# Patient Record
(demographics unavailable — no encounter records)

---

## 2024-10-10 NOTE — PHYSICAL EXAM
[Alert] : alert [No Acute Distress] : no acute distress [Normal Sclera/Conjunctiva] : normal sclera/conjunctiva [EOMI] : extra ocular movement intact [Normal Outer Ear/Nose] : the ears and nose were normal in appearance [No Neck Mass] : no neck mass was observed [No LAD] : no lymphadenopathy [Thyroid Not Enlarged] : the thyroid was not enlarged [No Thyroid Nodules] : no palpable thyroid nodules [Well Healed Scar] : well healed scar [No Respiratory Distress] : no respiratory distress [Clear to Auscultation] : lungs were clear to auscultation bilaterally [Normal Rate] : heart rate was normal [Regular Rhythm] : with a regular rhythm [Not Tender] : non-tender [Soft] : abdomen soft [No Stigmata of Cushings Syndrome] : no stigmata of Cushings Syndrome [Normal Gait] : normal gait [No Involuntary Movements] : no involuntary movements were seen [No Motor Deficits] : the motor exam was normal [No Tremors] : no tremors [Oriented x3] : oriented to person, place, and time [Normal Affect] : the affect was normal [Normal Insight/Judgement] : insight and judgment were intact [Normal Mood] : the mood was normal

## 2024-10-10 NOTE — HISTORY OF PRESENT ILLNESS
[FreeTextEntry1] : 32 year old woman with papillary thyroid cancer s/p total thyroidectomy and DOS SANTOS tx with post-surgical hypothyroidism, also with hx of pre-diabetes.  #PTC  - Diagnosed at age 19 with Papillary Thyroid Cancer on FNA biopsy in Dec 2011 for goiter work-up.  - Total thyroidectomy on 01/2012 with cervical neck dissection (17/27 LN (+) for metastatic disease) with post op DOS SANTOS (dose unknown). Surg Path: Level 6 Lymph Nodes 17/29 (+) for Papillary Carcinoma. Right Lobe - Multifocal PTC 0.5 cm and 2.5 cm, Classical variant, well-differentiated, Margins uninvolved by carcinoma, No tumor capsule, No extrathyroidal extension, path stage pN1a. Intermediate risk of recurrence (assuming LNs no larger than 3cm, cannot locate path report). - TUS in Feb 2013 showed new LAD. Received whole body scan and repeat DOS SANTOS 102 mci in 3/2013. - 10/2013 - went for second surgery for right neck and central mass removal, positive for malignancy. - Whole body scan done on 5/2014 negative.  - Thyroid US July 2015- No evidence of recurrent mass of thyroid bed. however, had NEW level two right cervical node not previously noted, lacking fatty hilum. FNA of these nodes in 2016 was nondiagnostic.  - TUS 2016 showed new 4mm nodule in Rt thyroid bed with the same cervical LAD. FNA of the LAD in 2017 was benign - Labs have shown persistent Tg levels (0.2 to 0.54) with negative Ab - TUS since 2015 have remained stable. - TUS Aug 2022 showed stable LAD WITHOUT evidence of previously seen right thyroid bed 4mm nodule and a NEGATIVE Thyroglobulin level. TSH goal loosened to 0.5-2. Levothyroxine adjusted to 137 mcg daily (from 150) at that time - Labs 2/2023 TSH 0.02, FT4 1.8, TG 0.29 detectable but low. At that time patient was c/o heat intolerance. Was instructed to decrease to LT4 dose but did not. - 8/2023: LT4 decrease from 137mcg daily to 125mcg daily - TUS 9/2023 right thyroidectomy bed is unremarkable but there was subcm 7 x 6 x 5 mm hypoechoic nodule in the left thyroidectomy bed, not clearly seen on the prior study. It is indeterminate in appearance. There is no lateral cervical adenopathy.  - 11/2023: TSH 0.16, FT4 1.6, TG 0.2 with TG ab negative. TSH goal tightened to 0.1-0.5 given TUS L thyroidectomy bed nodule. - 2/26/2024 TUS 6 month interval repeat: Again noted is 5 x 4 mm indeterminant hypoechoic area in the left thyroidectomy bed, not significantly changed as compared with the prior US 9/20/2023. There is no lateral cervical adenopathy - Labs 2/2024: TSH 0.11 - Labs 4/2024: TSH 0.39, TG 0.3 detectable but low, TG antibodies negative.  Patient reports adherence to LT4 125mcg daily. She still has heat intolerance during the day and night. No palpitations. No n/v, no constipation. Reports regular monthly menses. No compressive sx, no dypshagia/dysphonia, no neck pain. No weight changes.  She reports hx of tubal ligation.   #H/o pre-diabetes.  A1c 5.4% 2/2024 no longer preDM Trying to eat healthy and watch carbs.  #HLD Recent non-fasting lipid panel showed ,  2/2024  #Depression Reports depressive sx, insomnia, feelings of guilt, low energy. PHQ score 18. Holding off pharmacotherapy, watching with PCP.

## 2024-10-10 NOTE — ASSESSMENT
[Nearly Every Day (3)] : 6.) Feeling bad about yourself, or that you are a failure, or have let yourself or your family down? Nearly every day [Not at All (0)] : 9.) Thoughts that you would be off dead or of hurting yourself in some way? Not at all [Moderately Severe] : Severity of Depression is Moderately Severe [Very Difficult] : How difficult have these problems made it for you to do your work, take care of things at home, or get along with people? Very difficult [PHQ-9 Positive] : PHQ-9 Positive [FreeTextEntry1] : 32 year old woman with papillary thyroid cancer s/p total thyroidectomy and DOS SANTOS tx with post-surgical hypothyroidism  #PTC intermediate risk s/p total thyroidectomy + DOS SANTOS in 2012 with incomplete biochemical and structural response, s/p repeat surgery + repeat DOS SANTOS for recurrence in 2013 (as per notes); do not have records of initial pathology or DOS SANTOS scans/treatment although all done here per pt and prior notes TG had been persistently detectable but low Recent TUS 9/2023 and repeat 2/2024 showed new hypoechoic lesion in L thyroidectomy (not previously seen) suggestive of postsurgical change, no lymphadenopathy seen. Currently reports heat intolerance but not too bothersome Last TSH 0.39 with TG low but detectable at 0.3 with negative TG Ab - continue active surveillance, repeat TUS in 1 year (2/2025) - keep TSH at 0.1-0.5 given incomplete biochemical and structural response, currently on LT4 125mcg daily - Repeat TSH, FT4, TG with TG ab today - asked pt to please contact [facility] to obtain record of initial pathology and DOS SANTOS scans/treatment as unable to locate records in EMR   RTC in 6 months  Discussed with Dr. Magdalena Peterson MD Endocrine Fellow [[id number]] : 18

## 2024-10-10 NOTE — REVIEW OF SYSTEMS
[Depression] : depression [Insomnia] : insomnia [Anxiety] : anxiety [Stress] : stress [Heat Intolerance] : heat intolerance [Negative] : Heme/Lymph [Suicidal Ideation] : no suicidal ideation [Polydipsia] : no polydipsia [Cold Intolerance] : no cold intolerance

## 2024-10-16 NOTE — HISTORY OF PRESENT ILLNESS
[Lower-Rt-Q] : lower right quadrant [Suprapubic] : suprapubic area [Lower-Lt-Q] : left lower quadrant [FreeTextEntry8] : radiation to lower back [Heavy Bleeding] : described as heavy in severity [Definite:  ___ (Date)] : the last menstrual period was [unfilled] [Normal Amount/Duration] : was abnormal [Spotting Between  Menses] : no spotting between menses [Regular Cycle Intervals] : periods have been regular [Menstrual Cramps] : no menstrual cramps

## 2024-11-08 NOTE — PLAN
[FreeTextEntry1] : Patient is a 33yo P3 LMP 10/14 referred to GYN booking clinic for 6-8 month history of worsening pelvic pain. Reports heavier menstruation with severe dyspareunia. TVUS revealing adenomyosis without discrete fibroids, no obvious adnexal pathology. Patient has completed childbearing and is interested in definitive surgical management with hysterectomy.  #Pelvic pain/adenomyosis - R/b/a of surgical mgmt with total hysterectomy (and bilateral salpingectomy if tubes remain) discussed at length. Discussed likely ability for minimally invasive surgical approach. - Patient understands that there are many possible etiologies of chronic pelvic pain. We discussed there is a chance she undergoes a total hysterectomy without resolution of her pain. Pt expressed understanding. - EMBx obtained today - Surgical planning initiated - Medicaid hysterectomy form to be completed  #Irregular menses - Recent TFTs reviewed, wnl and followed closely by Endocrine for h/o thyroid cancer. Likely not the etiology of heavy menstruation.   Seen with Dr. Salmon PGY6 and attending, Dr. Abel Hilton PGY3  [facility] Addendum:  31 y/o P3 presenting for consultation for AUB-A, CPP, worse with menses and L>R. Also reports dyspareunia. She has tried OCPs in the past without success. She has completed childbearing and is s/p BTL. PMSH significant for thyroid ca s/p total thyroidectomy. TVUS (10/2024) 9.2cm uterus with adenomyosis. Physical exam c/w 11w anteverted mobile uterus with nonpalpable b/l adnexa. TFTs reviewed and wnl. Counseled patient on options including OCPs, LNG-IUD, GnRH analogs, surgery. Desires definitive management with hysterectomy. EMBx obtained. Plan for TLH, BS, cysto. R/B/A and post op considerations reviewed.  Sirisha Salmon MD [facility], PGY-6.

## 2024-11-08 NOTE — PHYSICAL EXAM
[Chaperone Declined] : Patient declined chaperone [Appropriately responsive] : appropriately responsive [Alert] : alert [No Acute Distress] : no acute distress [Soft] : soft [Non-tender] : non-tender [Non-distended] : non-distended [Oriented x3] : oriented x3 [Labia Majora] : normal [Labia Minora] : normal [Normal] : normal [Uterine Adnexae] : normal [FreeTextEntry4] : extremities well perfused [FreeTextEntry5] : normal respiratory effort on room air

## 2024-11-08 NOTE — PROCEDURE
[Endometrial Biopsy] : Endometrial biopsy [Time out performed] : Pre-procedure time out performed.  Patient's name, date of birth and procedure confirmed. [Consent Obtained] : Consent obtained [Irregular Bleeding] : irregular uterine bleeding [Risks] : risks [Benefits] : benefits [Alternatives] : alternatives [Patient] : patient [Negative] : negative pregnancy test [None] : none [Easy Passage] : Easy passage [Moderate] : moderate [Specimen Collected] : collected [Sent to Pathology] : placed in buffered formalin and sent for pathology [Tolerated Well] : Patient tolerated the procedure well [No Complications] : No complications [de-identified] : not needed

## 2024-11-08 NOTE — HISTORY OF PRESENT ILLNESS
[FreeTextEntry1] : Patient is a 31yo P3 LMP 10/14 referred to GYN booking clinic for 6-8 month history of worsening pelvic pain. Pt reports daily pelvic pain that radiates to the back and is worse on left side. States this pain is worsened when menstruating and during sexual intercourse. The dyspareunia has been present for approx 4 years and has worsening in the past year. Flow of menses has also increased to intermittent passage of large clots. Denies sxs of anemia and has never received a blood transfusion. Pt has completed childbearing and is s/p bilateral salpingectomy (per patient, no available records to confirm) and is interested in definitive surgical mgmt with hysterectomy.  TVUS (10/1/24): Uterus: 9.2 cm x 4.0 cm x 6.1 cm. The myometrium is heterogeneous which may be secondary to underlying adenomyosis. No discrete fibroid is seen. Endometrium: 12 mm. Within normal limits. Adnexa wnl.  OB hx:  x3 PMH: thyroid cancer s/p total thyroidectomy (2012), recurrence to right cervical LN s/p lymphadenectomy (10/2013) Meds: Levothyroxine 125mcg daily, advil PRN, Tylenol PRN All: NKDA PSHx: as above

## 2024-11-08 NOTE — END OF VISIT
[] : Fellow [FreeTextEntry3] : Discussed management of AUB at length, including medical, surgical options, UAE discussed. Patient desires definitive surgery. Discussed risks of TLHBS, cystoscopy. Reviewed potential of scar tissue need for supracervical hysterectomy, understands potential for cyclical bleeding and continual pap smears if cervix left in place. Reviewed risks  of surgery including but not limited to VTE, SSI, damage to bowel, bladder, need for laparotomy, blood transfusion. Reviewed post operative care. All questions answered at length  Sydney Roman MD

## 2024-11-15 NOTE — HISTORY OF PRESENT ILLNESS
[FreeTextEntry8] : 33 yo F with a PMH of papillary thyroid cancer (s/p thyroidectomy w/ DOS SANTOS), surgical hypothyroidism, migraines, presenting for APA. States has had 3 weeks of worsening left flank pain. No fever, dysuria, increased frequency. States recently had left ureteral stent iso septic nephrolithiasis. Pain had started then, but has been acutely worsening over the last 3 weeks, states it feels like renal colic. Recent CT negative for new stone or hydro per patient. Also notes that she has had increased incontinence of urine. Pending surgery for adenomyosis. per gyn may be complex pelvic pain.

## 2025-02-27 NOTE — ASSESSMENT
[Doing Well] : is doing well [No Sign of Infection] : is showing no signs of infection [de-identified] : 3 week cuff check pelvic rest Sydney Roman MD

## 2025-02-27 NOTE — HISTORY OF PRESENT ILLNESS
[Pain is well-controlled] : pain is well-controlled [Fever] : no fever [Chills] : no chills [Diarrhea] : no diarrhea [Pelvic Pressure] : no pelvic pressure [Vaginal Discharge] : no vaginal discharge [Clean/Dry/Intact] : clean, dry and intact [None] : no vaginal bleeding [Pathology reviewed] : pathology reviewed [de-identified] : TLHBS cystoscopy  [de-identified] : fibroid [de-identified] : Pt seen today s/p TLHBS cystoscopy states she is doing well mild complaints, states that she has been taking tylenol and motrin occasionally  states had VB last week went to ED was cleared, stated at that time she thinks she had an infection  denies fevers, chill, nausea, vomitting  [de-identified] : inc c/d/i  cuff intact, no bleeding

## 2025-03-05 NOTE — END OF VISIT
[] : Resident
Quality 130: Documentation Of Current Medications In The Medical Record: Current Medications Documented
Quality 226: Preventive Care And Screening: Tobacco Use: Screening And Cessation Intervention: Patient screened for tobacco use and is an ex/non-smoker
Quality 137: Melanoma: Continuity Of Care - Recall System: Patient information entered into a recall system that includes: target date for the next exam specified AND a process to follow up with patients regarding missed or unscheduled appointments
Quality 397: Melanoma: Reporting: Pathology report includes the pT Category, thickness, ulceration and mitotic rate, peripheral and deep margin status and presence or absence of microsatellitosis for invasive tumors.
Detail Level: Detailed
Quality 431: Preventive Care And Screening: Unhealthy Alcohol Use - Screening: Patient not identified as an unhealthy alcohol user when screened for unhealthy alcohol use using a systematic screening method

## 2025-03-23 NOTE — HISTORY OF PRESENT ILLNESS
[Pain is well-controlled] : pain is well-controlled [Healed] : healed [None] : no vaginal bleeding [Normal] : normal [Fever] : no fever [Chills] : no chills [Vaginal Bleeding] : no vaginal bleeding [Vaginal Discharge] : no vaginal discharge [Erythema] : not erythematous [Swelling] : not swollen [de-identified] : cuff visibly and digitally intact. Palpable suture material at R edge of cuff.

## 2025-03-23 NOTE — HISTORY OF PRESENT ILLNESS
[Pain is well-controlled] : pain is well-controlled [Healed] : healed [None] : no vaginal bleeding [Normal] : normal [Fever] : no fever [Chills] : no chills [Vaginal Bleeding] : no vaginal bleeding [Vaginal Discharge] : no vaginal discharge [Erythema] : not erythematous [Swelling] : not swollen [de-identified] : cuff visibly and digitally intact. Palpable suture material at R edge of cuff.

## 2025-03-23 NOTE — PLAN
[FreeTextEntry1] : Massiel Barclay 33y now 6w s/p TLH BS Cystoscopy presents for cuff check. Pt healing well and pain is well controlled. Cuff with palpable suture material at L corner.   - Pt to resume sexual activity in 2 weeks - Pt to call office / seek care should she develop fever/abnormal discharge/vaginal bleeding - Pt to return to GYN clinic for routine care  Pt discussed with [facility] fellow Dr. Salmon who discussed with attending Dr. Abel Harris PGY3  [facility] Fellow Addendum:  33y 6w post op s/p TLH BS Cystoscopy presenting for cuff check. Continuing to meet all post operative milestones. Cuff intact, well, healing, with some suture visualized at left angle. May resume normal activities and return for routine GYN followup.  Sirisha Salmon MD PGY-6, [facility]

## 2025-03-23 NOTE — REASON FOR VISIT
[Procedure: ___] : Procedure: [unfilled] [Indication: ___] : Indication: [unfilled] [de-identified] : 6 weeks postop

## 2025-03-23 NOTE — HISTORY OF PRESENT ILLNESS
[Pain is well-controlled] : pain is well-controlled [Healed] : healed [None] : no vaginal bleeding [Normal] : normal [Fever] : no fever [Chills] : no chills [Vaginal Bleeding] : no vaginal bleeding [Vaginal Discharge] : no vaginal discharge [Erythema] : not erythematous [Swelling] : not swollen [de-identified] : cuff visibly and digitally intact. Palpable suture material at R edge of cuff.

## 2025-03-23 NOTE — REASON FOR VISIT
[Procedure: ___] : Procedure: [unfilled] [Indication: ___] : Indication: [unfilled] [de-identified] : 6 weeks postop

## 2025-03-23 NOTE — REASON FOR VISIT
[Procedure: ___] : Procedure: [unfilled] [Indication: ___] : Indication: [unfilled] [de-identified] : 6 weeks postop

## 2025-04-03 NOTE — ASSESSMENT
[Nearly Every Day (3)] : 6.) Feeling bad about yourself, or that you are a failure, or have let yourself or your family down? Nearly every day [Not at All (0)] : 9.) Thoughts that you would be off dead or of hurting yourself in some way? Not at all [Moderately Severe] : Severity of Depression is Moderately Severe [Very Difficult] : How difficult have these problems made it for you to do your work, take care of things at home, or get along with people? Very difficult [PHQ-9 Positive] : PHQ-9 Positive [FreeTextEntry1] : 33 year old woman with papillary thyroid cancer s/p total thyroidectomy and DOS SANTOS tx with post-surgical hypothyroidism  #PTC intermediate risk s/p total thyroidectomy + DOS SANTOS in 2012 with incomplete biochemical and structural response, s/p repeat surgery + repeat DOS SANTOS for recurrence in 2013 (as per notes); do not have records of initial pathology or DOS SANTOS scans/treatment although all done at [facility]? per pt and prior notes TG had been persistently detectable but low Recent TUS 9/2023 and repeat 2/2024 showed new hypoechoic lesion in L thyroidectomy (not previously seen) suggestive of postsurgical change, no lymphadenopathy seen. She had repeat TUS 3/25/2025 but report is still not in. Last TSH 0.29 with TG <0.10 with TG ab 16. Currently reports worsening heat intolerance and hot flashes primarily at night time. - Follow up TUS report, done 3/25/25 - Check TSH, FT4, TG with TG ab today - keep TSH at 0.1-0.5 given incomplete structural response (biochemical response recently has been good). Will aim to keep TSH closer to 0.5 given heat intolerance symptoms. Currently on LT4 125mcg daily, will adjust as needed. - pt was unable to obtain record of initial pathology and DOS SANTOS scans/treatment from [facility]. We were unable to locate records in EMR   #Heat intolerance/hot flashes Worsening symptoms She had hysterectomy for endometriosis recently 2/2025 (ovaries are intact). She had regular menses prior so seems unlikely that this is low estrogen state.  - Checking TSH as above but will also check estradiol, LH FSH  #HLD  Prior non-fasting lipid panel showed ,  2/2024 - patient is fasting today, will check lipids   RTC in 6 months  Discussed with Dr. Valentin Peterson MD Endocrine Fellow [[id number]] : 18

## 2025-04-03 NOTE — ASSESSMENT
[Nearly Every Day (3)] : 6.) Feeling bad about yourself, or that you are a failure, or have let yourself or your family down? Nearly every day [Not at All (0)] : 9.) Thoughts that you would be off dead or of hurting yourself in some way? Not at all [Moderately Severe] : Severity of Depression is Moderately Severe [Very Difficult] : How difficult have these problems made it for you to do your work, take care of things at home, or get along with people? Very difficult [PHQ-9 Positive] : PHQ-9 Positive [FreeTextEntry1] : 33 year old woman with papillary thyroid cancer s/p total thyroidectomy and DOS SANTOS tx with post-surgical hypothyroidism  #PTC intermediate risk s/p total thyroidectomy + DOS SANTOS in 2012 with incomplete biochemical and structural response, s/p repeat surgery + repeat DOS SANTOS for recurrence in 2013 (as per notes); do not have records of initial pathology or DOS SANTOS scans/treatment although all done at [facility]? per pt and prior notes TG had been persistently detectable but low Recent TUS 9/2023 and repeat 2/2024 showed new hypoechoic lesion in L thyroidectomy (not previously seen) suggestive of postsurgical change, no lymphadenopathy seen. She had repeat TUS 3/25/2025 but report is still not in. Last TSH 0.29 with TG <0.10 with TG ab 16. Currently reports worsening heat intolerance and hot flashes primarily at night time. - Follow up TUS report, done 3/25/25 - Check TSH, FT4, TG with TG ab today - keep TSH at 0.1-0.5 given incomplete structural response (biochemical response recently has been good). Will aim to keep TSH closer to 0.5 given heat intolerance symptoms. Currently on LT4 125mcg daily, will adjust as needed. - pt was unable to obtain record of initial pathology and DOS SANTOS scans/treatment from [facility]. We were unable to locate records in EMR   #Heat intolerance/hot flashes Worsening symptoms She had hysterectomy for endometriosis recently 2/2025 (ovaries are intact). She had regular menses prior so seems unlikely that this is low estrogen state.  - Checking TSH as above but will also check estradiol, LH FSH  #HLD  Prior non-fasting lipid panel showed ,  2/2024 - patient is fasting today, will check lipids   RTC in 6 months  Discussed with Dr. Valentin Peterson MD Endocrine Fellow [[id number]] : 18

## 2025-04-03 NOTE — REVIEW OF SYSTEMS
[Depression] : depression [Insomnia] : insomnia [Anxiety] : anxiety [Stress] : stress [Heat Intolerance] : heat intolerance [Negative] : Heme/Lymph [Suicidal Ideation] : no suicidal ideation [Polydipsia] : no polydipsia [Cold Intolerance] : no cold intolerance [Hot Flashes] : hot flashes

## 2025-04-03 NOTE — HISTORY OF PRESENT ILLNESS
[FreeTextEntry1] : 33 year old woman with papillary thyroid cancer s/p total thyroidectomy and DOS SANTOS tx with post-surgical hypothyroidism, also with hx of pre-diabetes.  #PTC  - Diagnosed at age 19 with Papillary Thyroid Cancer on FNA biopsy in Dec 2011 for goiter work-up.  - Total thyroidectomy on 01/2012 with cervical neck dissection (17/27 LN (+) for metastatic disease) with post op DOS SANTOS (dose unknown). Surg Path: Level 6 Lymph Nodes 17/29 (+) for Papillary Carcinoma. Right Lobe - Multifocal PTC 0.5 cm and 2.5 cm, Classical variant, well-differentiated, Margins uninvolved by carcinoma, No tumor capsule, No extrathyroidal extension, path stage pN1a. Intermediate risk of recurrence (assuming LNs no larger than 3cm, cannot locate path report). - TUS in Feb 2013 showed new LAD. Received whole body scan and repeat DOS SANTOS 102 mci in 3/2013. - 10/2013 - went for second surgery for right neck and central mass removal, positive for malignancy. - Whole body scan done on 5/2014 negative.  - Thyroid US July 2015- No evidence of recurrent mass of thyroid bed. however, had NEW level two right cervical node not previously noted, lacking fatty hilum. FNA of these nodes in 2016 was nondiagnostic.  - TUS 2016 showed new 4mm nodule in Rt thyroid bed with the same cervical LAD. FNA of the LAD in 2017 was benign - Labs have shown persistent Tg levels (0.2 to 0.54) with negative Ab - TUS since 2015 have remained stable. - TUS Aug 2022 showed stable LAD WITHOUT evidence of previously seen right thyroid bed 4mm nodule and a NEGATIVE Thyroglobulin level. TSH goal loosened to 0.5-2. Levothyroxine adjusted to 137 mcg daily (from 150) at that time - Labs 2/2023 TSH 0.02, FT4 1.8, TG 0.29 detectable but low. At that time patient was c/o heat intolerance. Was instructed to decrease to LT4 dose but did not. - 8/2023: LT4 decrease from 137mcg daily to 125mcg daily - TUS 9/2023 right thyroidectomy bed is unremarkable but there was subcm 7 x 6 x 5 mm hypoechoic nodule in the left thyroidectomy bed, not clearly seen on the prior study. It is indeterminate in appearance. There is no lateral cervical adenopathy.  - 11/2023: TSH 0.16, FT4 1.6, TG 0.2 with TG ab negative. TSH goal tightened to 0.1-0.5 given TUS L thyroidectomy bed nodule. - 2/26/2024 TUS 6 month interval repeat: Again noted is 5 x 4 mm indeterminant hypoechoic area in the left thyroidectomy bed, not significantly changed as compared with the prior US 9/20/2023. There is no lateral cervical adenopathy - Labs 2/2024: TSH 0.11 - Labs 4/2024: TSH 0.39, TG 0.3 detectable but low, TG antibodies negative. - Labs 10/2024: TG level now suppressed <0.10 (on new assay, now Roche elecsys) with TG antibodies 16. TSH 0.29 at goal range.  Continue current LT4 125mcg daily.  Patient reports adherence to LT4 125mcg daily. Her heat intolerance is worse. She feels like she has hot flashes mostly at night-time. No palpitations. No n/v, no constipation. No compressive sx, no dypshagia/dysphonia, no neck pain. No weight changes.  She reports hx of tubal ligation. She had hx of regular menses until she recently had hysterectomy 2/2025 for her endometriosis, she is currently 8 weeks post-surgery.  #H/o pre-diabetes.  A1c 5.4% 2/2024 no longer preDM, repeat A1c 1/2025 was 5.5 Trying to eat healthy and watch carbs.  #HLD non-fasting lipid panel showed ,  2/2024 She is fasting today   #Depression Reports depressive sx, insomnia, feelings of guilt, low energy. PHQ score 18. Holding off pharmacotherapy, watching with PCP.

## 2025-06-12 NOTE — PHYSICAL EXAM
[Normal] : no acute distress, well nourished, well developed and well-appearing [Grossly Normal Strength/Tone] : grossly normal strength/tone [de-identified] : Negative anterior and posterior draw of the right leg, negative lateral and medial strain tests of right leg. Full range of motion in the right leg. Mildly positive Mehnaz test on internal rotation along with Apley grind test on internal rotation. No obvious effusion present

## 2025-06-12 NOTE — PHYSICAL EXAM
[Normal] : no acute distress, well nourished, well developed and well-appearing [Grossly Normal Strength/Tone] : grossly normal strength/tone [de-identified] : Negative anterior and posterior draw of the right leg, negative lateral and medial strain tests of right leg. Full range of motion in the right leg. Mildly positive Mehnaz test on internal rotation along with Apley grind test on internal rotation. No obvious effusion present

## 2025-06-12 NOTE — HISTORY OF PRESENT ILLNESS
[FreeTextEntry8] : 33 year old woman with history of papillary thyroid cancer (s/p thyroidectomy w/ DOS SANTOS), surgical hypothyroidism, migraines, presenting for APA  Patient complains of right knee pain. Pain started this week but overall issue started three weeks ago. No reported injury/trauma to the right knee. Every time she took a step her knee felt like it was "cracking and sliding a bit with every step". Pain feels like if she has a bruise. No pain right now at rest but describes "numbness". "cracking" is on the medial and superior aspect and pain is lateral. No radiation of pain. Has not taken anything for the pain. Came now due to pain. Pain worse when walking, has pain when standing still, but not worse than baseline. No recent fevers, Feels like the right knee is swollen for past week. Gait reportedly not affected. No recent changes in weight. 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 Hysterectomy in February

## 2025-06-12 NOTE — REVIEW OF SYSTEMS
[Fever] : no fever [Chills] : no chills [Night Sweats] : no night sweats [Recent Change In Weight] : ~T no recent weight change [Discharge] : no discharge [Pain] : no pain [Vision Problems] : no vision problems [Earache] : no earache [Hearing Loss] : no hearing loss [Nasal Discharge] : no nasal discharge [Sore Throat] : no sore throat [Chest Pain] : no chest pain [Shortness Of Breath] : no shortness of breath [Cough] : no cough [Abdominal Pain] : no abdominal pain [Nausea] : no nausea [Diarrhea] : diarrhea [Vomiting] : no vomiting [Heartburn] : no heartburn [Dysuria] : no dysuria [Itching] : no itching [Skin Rash] : no skin rash [FreeTextEntry9] : Per HPI

## 2025-06-12 NOTE — ASSESSMENT
[FreeTextEntry1] : 33 year old woman with history of papillary thyroid cancer (s/p thyroidectomy w/ DOS SANTOS), surgical hypothyroidism, migraines, presenting for APA  #Right knee pain - Given location possibility for IT, LCL, lateral meniscus pathology - Given provocative examination, seems to push more towards meniscus involvement - Trial meloxicam 15mg daily with food for 10 days along with knee sleeve - PT referral placed - Hold off on imaging for now - Patient counseled to return in no improvement for further evaluation  Return to clinic for next CPE or sooner as needed   Patient's visit and plan of care discussed with Dr. Eugene Lynne MD PGY3

## 2025-06-24 NOTE — DISCUSSION/SUMMARY
[de-identified] : Attending Attestation:   Seen with Dr. Jostin Hester (Sports Medicine Fellow) I saw and evaluated the patient and was involved with and agree with the fellow's history, physical exam, and I personally documented the assessment and plan of care.   ASSESSMENT: 33F hx thyroid cancer s/p thyroidectomy on synthroid and endometriosis s/p hysterectomy with atraumatic R knee pain since mid-May 2025 associated with crepitus and swelling. On exam, she has medial joint line ttp with a positive mcmurrays and mild knee effusion. History and exam are concerning for meniscus injury. Also has component of chondromalacia patellae. Will pursue MRI R knee to eval for meniscus injury as may require surgical intervention. in the meantime, applied hinged knee brace and will continue PT and HEP for meniscus injury. Plan to continue with meloxicam, acetaminophen for pain control. pt to follow up for MRI review and reassessment in 2 weeks.   Will treat symptoms with conservative management with activity modification, analgesic medications, HEP/PT, and re-evaluate the patient to see if they would benefit from further diagnostic testing, or referral for injection therapy or surgical intervention.   Clinical and radiographic findings discussed. Diagnosis, prognosis and treatment options reviewed. Patient verbalizes understanding and all questions answered. The patient adamant at this time about not having any type of surgical intervention. Wants only to continue with non-operative management.   PLAN: Additional Testing: MRI R knee wo contrast Further Intervention: Patient would like to continue with [non-operative] management Weight-bearing Status: WBAT Assistive Devices: None Therapy: PT prescribed, HEP recommended and reviewed Health Management: BMI/Weight Management and physical activity level reviewed with the patient Home Modalities: RICE protocol for pain/swelling and home modalities reviewed with the patient Medications: OTC analgesics -Prescription for Meloxicam. Patient reports that she is not pregnant. Advised about adverse effects and recommended taking medication with food, and to avoid if there is any GI upset or history of renal issues, as there is potential to cause PUD, GIB, and JOSE LUIS if taken chronically or in large doses. I recommended the patient to primarily take Tylenol for pain and take the prescribed NSAID sparingly. Recommended to avoid other OTC NSAIDs while on this medication. Orthotics: Hinged Right knee brace Work Status: not currently employeed Activity Status: Avoid aggravating and high impact activities Sports/Gym Status: No gym or sports until cleared medically Follow-up: 2 weeks for re-eval and MRI review   Oswald Boyle MD

## 2025-06-24 NOTE — PHYSICAL EXAM
[de-identified] : GEN: no acute distress, well developed HEAD: normocephalic, atraumatic ENT: no stridor CV: normal peripheral perfusion RESP: no respiratory distress SKIN: warm and dry, no skin breaks NEURO: alert and oriented, no focal sensory or motor deficits   KNEE:  Inspection without erythema, deformity or swelling of the knee. Palpation with (+) TTP to the medial joint and lateral joint line, but without warmth, and without TTP to the quad tendon, patella, patella tendon, femoral condyles, tibial plateaus and tuberosity, and fibular head. Full active and passive ROM of the knee with 0 to 140 degrees of flexion. Normal DP and PT pulses bilaterally. Brisk capillary refill. Soft compartments of the lower extremity. Normal motor examination with 5/5 strength in the muscle groups of the lower leg. Normal sensory examination of the lower leg.  Normal gait.  Special tests: Valgus and varus stress with no ligamentous laxity at full extension and at 30 degrees of flexion. Lachman: negative Anterior and Posterior Drawer: negative Mehnaz: POS (medial) Patellar Grind: POS [de-identified] : MRI of R Knee: pending  XRay of R knee on 6/21/25 at [facility] ED: No acute fracture. No dislocation. Joint spaces are maintained. No knee joint effusion.

## 2025-06-24 NOTE — HISTORY OF PRESENT ILLNESS
[de-identified] :  33 F w/ Hx of thyroid cancer s/p thyroidectomy on synthroid and endometriosis s/p hysterectomy who p/w R knee pain that started 4 weeks ago in mid May 2025. Started without any trauma or inciting event. Pain is worse over the medial aspect, but also has pain over the lateral aspect. Associated with clicking sensation with knee flexion. Pain worse with going downstairs, bending the knee and squatting. Endorses swelling to anteromedial knee. Initially seen by PCP who thought symptoms were likely meniscus in nature, prescribed PT, and she has also been performing HEP. She had gradual worsening of pain and swelling and was seen in the ED on 6/21/25. She has been taking Meloxicam that was prescribed by her PCP.   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

## 2025-07-02 NOTE — HISTORY OF PRESENT ILLNESS
[FreeTextEntry1] : 32yo (LMP 2/25 ) s/p TLH for adeno/fibroids and h/o thyroid ca presents c/o palpable breast rt breast lump x 2 wks.  Pt reports b/l breast pain- new.  Denies nipple d/c , skin changes
